# Patient Record
Sex: FEMALE | Race: WHITE | URBAN - METROPOLITAN AREA
[De-identification: names, ages, dates, MRNs, and addresses within clinical notes are randomized per-mention and may not be internally consistent; named-entity substitution may affect disease eponyms.]

---

## 2020-10-15 ENCOUNTER — HISTORICAL (OUTPATIENT)
Dept: PREADMISSION TESTING | Facility: HOSPITAL | Age: 70
End: 2020-10-15

## 2020-10-19 ENCOUNTER — HISTORICAL (OUTPATIENT)
Dept: ADMINISTRATIVE | Facility: HOSPITAL | Age: 70
End: 2020-10-19

## 2021-02-18 ENCOUNTER — HISTORICAL (OUTPATIENT)
Dept: ADMINISTRATIVE | Facility: HOSPITAL | Age: 71
End: 2021-02-18

## 2021-03-04 ENCOUNTER — HISTORICAL (OUTPATIENT)
Dept: ADMINISTRATIVE | Facility: HOSPITAL | Age: 71
End: 2021-03-04

## 2021-04-15 ENCOUNTER — HISTORICAL (OUTPATIENT)
Dept: ADMINISTRATIVE | Facility: HOSPITAL | Age: 71
End: 2021-04-15

## 2021-04-17 LAB — FINAL CULTURE: NORMAL

## 2021-04-26 ENCOUNTER — HISTORICAL (OUTPATIENT)
Dept: ADMINISTRATIVE | Facility: HOSPITAL | Age: 71
End: 2021-04-26

## 2022-04-30 NOTE — OP NOTE
DATE OF SURGERY:        SURGEON:  Erik Davis MD  ASSISTANT:  Melly Greer NP    PREOPERATIVE DIAGNOSIS:  Full thickness macular hole to the right eye.    POSTOPERATIVE DIAGNOSIS:  Full thickness macular hole to the right eye.    PROCEDURE:  Pars plana vitrectomy with internal limiting membrane peeling, fluid air exchange and air gas exchange with 20% SF6 gas all to the right eye.    ANESTHESIA:  General.    ESTIMATED BLOOD LOSS:  Less than 5 cc.    COMPLICATIONS:  None.    PROCEDURE INDICATIONS:  Ms. Vazquez has a history of a full-thickness macular hole to the right eye.    PROCEDURE DESCRIPTION:  The patient was taken to the operative theater where general anesthesia was begun.  The right eye was prepped and draped in the normal sterile fashion and a lid speculum was applied.  A standard 3-port 25-gauge pars plana vitrectomy was performed with all trocars placed 3.5 mm from the surgical limbus.  A core vitrectomy was performed.  A posterior vitreous detachment was created with the vitrectomy cutter.  ICG dye was used to stain the internal limiting membrane, which was then peeled from the macular surface using an ILM forceps without complication.  A fluid air exchange followed by an air gas exchange with 20% SF6 gas was then performed.  All instruments were removed from the eye and all sclerotomies were massaged closed with cotton-tip swabs.  Several drops of TobraDex ophthalmic solution were applied to the eye.  The postoperative intraocular pressure was 15 mmHg.  The lid speculum and eye drape were then removed and the eye was covered with a gauze patch and a Reyna shield.  The patient was then transported to the postoperative care unit to recover.    DISCHARGE CONDITION:  Good.      DISPOSITION:  Home with followup with Dr. Davis following day.  This patient tolerated the procedure well.        ______________________________  Erik Davis MD    RIB/SH  DD:  10/21/2020  Time:  04:50PM  DT:   10/21/2020  Time:  04:55PM  Job #:  [id number]

## 2022-04-30 NOTE — OP NOTE
Patient:   Harper Vazquez            MRN: [id number]            FIN: [id number]               Age:   70 years     Sex:  Female     :  1950   Associated Diagnoses:   None   Author:   Hillary ATKINSON MD, Rosalio CHOW      Pre-op Dx:  Cataract of the Left eye    Post-op Dx:  Cataract of the Left eye     Procedure:  Cataract extraction by phacoemulsification   with an IOL    Anes:   Topical    Complications: None    Procedure in detail:   Dilating drops were given in the holding area.  The patient was brought into the surgical suite, identified and the correct eye confirmed.  Topical anesthesia was applied.  The eye was then prepped and draped in a sterile fashion.  A supersharp blade was used to make a paracentesis at the 5 oclock position.  1% lidocaine 1cc was injected in to the AC thru cannula then Viscoelastic was placed in the anterior chamber.  A clear corneal incision was made at the   3 oclock position with a keratome blade.  Next, a cystatome and utrata forceps were used to make and remove a 360 degree capsulorrhexis.  The phaco handpiece was placed into the anterior chamber and the lens removed in a divide and conquer fashion.  The remaining cortex was removed with the I & A handpiece.  Viscoelastic was placed into the capsular bag, which remained clear and intact.  An  IOL was placed in the bag and rotated into position.  The remaining viscoelastic was removed with the I &A handpiece.  The incision was hydrated with BSS and checked for leakage.  No leakage was found.  The drapes were removed and antibiotic drops were placed into the eye.  The patient tolerated the procedure well and was moved back to the holding room.  Sunglasses and instructions were personally given to the patient and family.  The patient will follow-up at my office tomorrow.      EFX 12     25.0  ZCBOO IOL        Rush Thornton II, M.D.

## 2022-04-30 NOTE — OP NOTE
Patient:   Harper Vazquez            MRN: [id number]            FIN: [id number]               Age:   70 years     Sex:  Female     :  1950   Associated Diagnoses:   None   Author:   Hillary ATKINSON MD, Rosalio CHOW      Pre-op Dx:  Cataract of the Right eye    Post-op Dx:  Cataract of the Right eye     Procedure:  Cataract extraction by phacoemulsification   with an IOL    Anes:   Topical    Complications: None    Procedure in detail:   Dilating drops were given in the holding area.  The patient was brought into the surgical suite, identified and the correct eye confirmed.  Topical anesthesia was applied.  The eye was then prepped and draped in a sterile fashion.  A supersharp blade was used to make a paracentesis at the 11 oclock position.  Lidocaine 2% 1/2cc + BSS 1/2cc was injected into AC thru cannula, then viscoelastic was placed in the anterior chamber.  A clear corneal incision was made at the 9 oclock position with a keratome blade.  Next, a cystotome and utrata forceps were used to make a 360 degree capsulorrhexis.  The phaco handpiece was placed into the anterior chamber and the lens removed in a divide and conquer fashion.  The remaining cortex was removed with the I & A handpiece.  Viscoelastic was placed into the capsular bag, which remained clear and intact.  An  IOL was placed in the bag and rotated into position.  The remaining viscoelastic was removed with the I &A handpiece.  The incision was hydrated with BSS and checked for leakage.  No leakage was found.  The drapes were removed and antibiotic drops were placed into the eye.  The patient tolerated the procedure well and was moved back to the holding room.  Sunglasses and instructions were personally given to the patient and family.  The patient will follow-up at my office tomorrow.     EFX 37      25.5  ZCBOO iol        Rush Thornton II, M.D.

## 2022-04-30 NOTE — OP NOTE
DATE OF SURGERY:        SURGEON:  Erik Davis MD    PREOPERATIVE DIAGNOSIS:  Full-thickness macular hole to the left eye.    POSTOPERATIVE DIAGNOSIS:  Full-thickness macular hole to the left eye.    PROCEDURES:  Pars plana vitrectomy with internal limiting membrane peeling, fluid air exchange, and air gas exchange with 20% SF6 gas, all to the left eye.    ANESTHESIA:  General.    ESTIMATED BLOOD LOSS:  Less than 5 cc.    COMPLICATIONS:  None.    PROCEDURE INDICATIONS:  Ms. Vazquez has a history of a full-thickness macular hole of the left eye.    PROCEDURE DESCRIPTION:  The patient was taken to the operative theater where general anesthesia was begun.  The left eye was prepped and draped in the normal sterile fashion and a lid speculum was applied.  A standard 3-port, 25-gauge pars plana vitrectomy was performed with all trocars placed 3.5 mm from the surgical limbus.  A core vitrectomy was performed including removal of the posterior hyaloid out to the peripheral retina.  ICG dye was used to stain the internal limiting membrane which was then peeled from the macular surface using an ILM forceps without complication.  The peripheral retina was examined with scleral depression and no retinal breaks were identified.  A fluid air exchange, followed by an air gas exchange with 20% SF6 gas was performed.  All instruments were removed from the eye and all sclerotomies were massaged closed with cotton-tip swabs.  Several drops of TobraDex ophthalmic solution were applied to the eye.  The postoperative intraocular pressure was 15 mmHg.  The lid speculum and eye drape were then removed and the eye was covered with a gauze patch and a Reyna shield.  The patient was then transported to the postoperative care unit to recover.    DISCHARGE CONDITION:  Good.      DISPOSITION:  Home with followup with Dr. Davis the following day.  This patient tolerated the procedure well.        ______________________________  Erik SANTOS  MD JAYLEN Davis/JEOVANY  DD:  04/26/2021  Time:  01:19PM  DT:  04/26/2021  Time:  01:37PM  Job #:  [id number]